# Patient Record
Sex: FEMALE | Race: BLACK OR AFRICAN AMERICAN | NOT HISPANIC OR LATINO | Employment: UNEMPLOYED | URBAN - METROPOLITAN AREA
[De-identification: names, ages, dates, MRNs, and addresses within clinical notes are randomized per-mention and may not be internally consistent; named-entity substitution may affect disease eponyms.]

---

## 2023-03-08 ENCOUNTER — NURSE TRIAGE (OUTPATIENT)
Dept: NURSING | Facility: CLINIC | Age: 28
End: 2023-03-08

## 2023-03-08 VITALS
HEART RATE: 65 BPM | OXYGEN SATURATION: 100 % | TEMPERATURE: 98.3 F | SYSTOLIC BLOOD PRESSURE: 125 MMHG | BODY MASS INDEX: 30.16 KG/M2 | WEIGHT: 181 LBS | HEIGHT: 65 IN | RESPIRATION RATE: 20 BRPM | DIASTOLIC BLOOD PRESSURE: 91 MMHG

## 2023-03-09 ENCOUNTER — HOSPITAL ENCOUNTER (EMERGENCY)
Facility: CLINIC | Age: 28
Discharge: HOME | End: 2023-03-09
Attending: EMERGENCY MEDICINE
Payer: COMMERCIAL

## 2023-03-09 DIAGNOSIS — K08.89 PAIN, DENTAL: ICD-10-CM

## 2023-03-09 ASSESSMENT — ACTIVITIES OF DAILY LIVING (ADL): ADLS_ACUITY_SCORE: 33

## 2023-03-09 NOTE — TELEPHONE ENCOUNTER
Kush and sister call and says that Kush has bad lower molar pain. Pain = 10/10. Pt. Is going to have the tooth removed on Friday. Sister will take pt. To the [facility] ER now. Declined paging the Dr. COVID 19 Nurse Triage Plan/Patient Instructions    Please be aware that novel coronavirus (COVID-19) may be circulating in the community. If you develop symptoms such as fever, cough, or SOB or if you have concerns about the presence of another infection including coronavirus (COVID-19), please contact your health care provider or visit [email or web address].     Disposition/Instructions    In-Person Visit with provider recommended. Reference Visit Selection Guide.    Thank you for taking steps to prevent the spread of this virus.  o Limit your contact with others.  o Wear a simple mask to cover your cough.  o Wash your hands well and often.    Resources    [facility]: About COVID-19: [email or web address]    CDC: What to Do If You're Sick: www.cdc.gov/coronavirus/2019-ncov/about/steps-when-sick.html    CDC: Ending Home Isolation: www.cdc.gov/coronavirus/2019-ncov/hcp/disposition-in-home-patients.html     CDC: Caring for Someone: www.cdc.gov/coronavirus/2019-ncov/if-you-are-sick/care-for-someone.html     [facility]: Interim Guidance for Hospital Discharge to Home: www.health.[facility].mn.us/diseases/coronavirus/hcp/hospdischarge.pdf    [facility] clinical trials (COVID-19 research studies): [email or web address]     Below are the COVID-19 hotlines at the [facility] of Health ([facility]). Interpreters are available.   o For health questions: Call [phone] or [phone] (7 a.m. to 7 p.m.)  o For questions about schools and childcare: Call [phone] or [phone] (7 a.m. to 7 p.m.)                     Reason for Disposition    Toothache followed tooth injury    [1] SEVERE pain AND [2] not improved 2 hours after pain medicine/ice    Additional  "Information    Negative: Shock suspected (e.g., cold/pale/clammy skin, too weak to stand, low BP, rapid pulse)    Negative: [1] Similar pain previously AND [2] it was from \"heart attack\"    Negative: [1] Similar pain previously AND [2] it was from \"angina\" AND [3] not relieved by nitroglycerin    Negative: Sounds like a life-threatening emergency to the triager    Negative: Chest pain    Negative: Knocked out (unconscious) > 1 minute    Negative: Difficult to awaken or acting confused (e.g., disoriented, slurred speech)    Negative: Severe neck pain    Negative: Sounds like a life-threatening emergency to the triager    Negative: Head injury is main concern    Negative: Neck injury is main concern    Negative: Wound looks infected    Negative: Tooth knocked out    Negative: Tooth is almost falling out    Negative: [1] Bleeding AND [2] won't stop after 10 minutes of direct pressure (using correct technique)    Negative: [1] Tooth pushed out if its normal position AND [2] looks severe    Negative: [1] Tooth pushed out of its normal position AND [2] interferes with normal bite or chewing    Negative: Sounds like a serious injury to the triager    Protocols used: TOOTHACHE-A-AH, TOOTH INJURY-A-AH      "

## 2023-03-09 NOTE — ED TRIAGE NOTES
Pt arrives complaining of dental pain with 2 teeth. Cracked wisdom tooth and needs root canal. Has appointment on Friday but unable to handle pain at this time.    Last dose of ibuprofen at 10pm     Triage Assessment     Row Name 03/08/23 5888       Triage Assessment (Adult)    Airway WDL WDL       Respiratory WDL    Respiratory WDL WDL       Skin Circulation/Temperature WDL    Skin Circulation/Temperature WDL WDL       Cardiac WDL    Cardiac WDL WDL       Peripheral/Neurovascular WDL    Peripheral Neurovascular WDL WDL       Cognitive/Neuro/Behavioral WDL    Cognitive/Neuro/Behavioral WDL WDL